# Patient Record
Sex: FEMALE | Race: WHITE | NOT HISPANIC OR LATINO | URBAN - METROPOLITAN AREA
[De-identification: names, ages, dates, MRNs, and addresses within clinical notes are randomized per-mention and may not be internally consistent; named-entity substitution may affect disease eponyms.]

---

## 2021-09-24 ENCOUNTER — EMERGENCY (EMERGENCY)
Facility: HOSPITAL | Age: 36
LOS: 1 days | Discharge: HOME | End: 2021-09-24
Attending: EMERGENCY MEDICINE
Payer: MEDICAID

## 2021-09-24 VITALS
HEART RATE: 84 BPM | SYSTOLIC BLOOD PRESSURE: 135 MMHG | RESPIRATION RATE: 16 BRPM | WEIGHT: 172.62 LBS | DIASTOLIC BLOOD PRESSURE: 77 MMHG | OXYGEN SATURATION: 99 % | TEMPERATURE: 98 F

## 2021-09-24 DIAGNOSIS — Z98.890 OTHER SPECIFIED POSTPROCEDURAL STATES: Chronic | ICD-10-CM

## 2021-09-24 LAB
ALBUMIN SERPL ELPH-MCNC: 3.6 G/DL — SIGNIFICANT CHANGE UP (ref 3.5–5)
ALP SERPL-CCNC: 67 U/L — SIGNIFICANT CHANGE UP (ref 40–120)
ALT FLD-CCNC: 22 U/L DA — SIGNIFICANT CHANGE UP (ref 10–60)
ANION GAP SERPL CALC-SCNC: 12 MMOL/L — SIGNIFICANT CHANGE UP (ref 5–17)
APPEARANCE UR: CLEAR — SIGNIFICANT CHANGE UP
AST SERPL-CCNC: 10 U/L — SIGNIFICANT CHANGE UP (ref 10–40)
BASOPHILS # BLD AUTO: 0.06 K/UL — SIGNIFICANT CHANGE UP (ref 0–0.2)
BASOPHILS NFR BLD AUTO: 0.9 % — SIGNIFICANT CHANGE UP (ref 0–2)
BILIRUB SERPL-MCNC: 1.2 MG/DL — SIGNIFICANT CHANGE UP (ref 0.2–1.2)
BILIRUB UR-MCNC: NEGATIVE — SIGNIFICANT CHANGE UP
BUN SERPL-MCNC: 6 MG/DL — LOW (ref 7–18)
CALCIUM SERPL-MCNC: 8.7 MG/DL — SIGNIFICANT CHANGE UP (ref 8.4–10.5)
CHLORIDE SERPL-SCNC: 109 MMOL/L — HIGH (ref 96–108)
CO2 SERPL-SCNC: 20 MMOL/L — LOW (ref 22–31)
COLOR SPEC: YELLOW — SIGNIFICANT CHANGE UP
CREAT SERPL-MCNC: 0.75 MG/DL — SIGNIFICANT CHANGE UP (ref 0.5–1.3)
DIFF PNL FLD: NEGATIVE — SIGNIFICANT CHANGE UP
EOSINOPHIL # BLD AUTO: 0.38 K/UL — SIGNIFICANT CHANGE UP (ref 0–0.5)
EOSINOPHIL NFR BLD AUTO: 5.4 % — SIGNIFICANT CHANGE UP (ref 0–6)
GLUCOSE SERPL-MCNC: 91 MG/DL — SIGNIFICANT CHANGE UP (ref 70–99)
GLUCOSE UR QL: NEGATIVE — SIGNIFICANT CHANGE UP
HCG SERPL-ACNC: <1 MIU/ML — SIGNIFICANT CHANGE UP
HCT VFR BLD CALC: 39.2 % — SIGNIFICANT CHANGE UP (ref 34.5–45)
HGB BLD-MCNC: 13.2 G/DL — SIGNIFICANT CHANGE UP (ref 11.5–15.5)
IMM GRANULOCYTES NFR BLD AUTO: 0.3 % — SIGNIFICANT CHANGE UP (ref 0–1.5)
KETONES UR-MCNC: NEGATIVE — SIGNIFICANT CHANGE UP
LEUKOCYTE ESTERASE UR-ACNC: ABNORMAL
LIDOCAIN IGE QN: 88 U/L — SIGNIFICANT CHANGE UP (ref 73–393)
LYMPHOCYTES # BLD AUTO: 2.14 K/UL — SIGNIFICANT CHANGE UP (ref 1–3.3)
LYMPHOCYTES # BLD AUTO: 30.4 % — SIGNIFICANT CHANGE UP (ref 13–44)
MCHC RBC-ENTMCNC: 26.7 PG — LOW (ref 27–34)
MCHC RBC-ENTMCNC: 33.7 GM/DL — SIGNIFICANT CHANGE UP (ref 32–36)
MCV RBC AUTO: 79.2 FL — LOW (ref 80–100)
MONOCYTES # BLD AUTO: 0.64 K/UL — SIGNIFICANT CHANGE UP (ref 0–0.9)
MONOCYTES NFR BLD AUTO: 9.1 % — SIGNIFICANT CHANGE UP (ref 2–14)
NEUTROPHILS # BLD AUTO: 3.81 K/UL — SIGNIFICANT CHANGE UP (ref 1.8–7.4)
NEUTROPHILS NFR BLD AUTO: 53.9 % — SIGNIFICANT CHANGE UP (ref 43–77)
NITRITE UR-MCNC: NEGATIVE — SIGNIFICANT CHANGE UP
NRBC # BLD: 0 /100 WBCS — SIGNIFICANT CHANGE UP (ref 0–0)
PH UR: 7 — SIGNIFICANT CHANGE UP (ref 5–8)
PLATELET # BLD AUTO: 369 K/UL — SIGNIFICANT CHANGE UP (ref 150–400)
POTASSIUM SERPL-MCNC: 3.6 MMOL/L — SIGNIFICANT CHANGE UP (ref 3.5–5.3)
POTASSIUM SERPL-SCNC: 3.6 MMOL/L — SIGNIFICANT CHANGE UP (ref 3.5–5.3)
PROT SERPL-MCNC: 7.4 G/DL — SIGNIFICANT CHANGE UP (ref 6–8.3)
PROT UR-MCNC: NEGATIVE — SIGNIFICANT CHANGE UP
RBC # BLD: 4.95 M/UL — SIGNIFICANT CHANGE UP (ref 3.8–5.2)
RBC # FLD: 13.3 % — SIGNIFICANT CHANGE UP (ref 10.3–14.5)
SODIUM SERPL-SCNC: 141 MMOL/L — SIGNIFICANT CHANGE UP (ref 135–145)
SP GR SPEC: 1 — LOW (ref 1.01–1.02)
UROBILINOGEN FLD QL: NEGATIVE — SIGNIFICANT CHANGE UP
WBC # BLD: 7.05 K/UL — SIGNIFICANT CHANGE UP (ref 3.8–10.5)
WBC # FLD AUTO: 7.05 K/UL — SIGNIFICANT CHANGE UP (ref 3.8–10.5)

## 2021-09-24 PROCEDURE — 74177 CT ABD & PELVIS W/CONTRAST: CPT | Mod: 26,MG

## 2021-09-24 PROCEDURE — 96374 THER/PROPH/DIAG INJ IV PUSH: CPT | Mod: 25

## 2021-09-24 PROCEDURE — 96361 HYDRATE IV INFUSION ADD-ON: CPT

## 2021-09-24 PROCEDURE — 84702 CHORIONIC GONADOTROPIN TEST: CPT

## 2021-09-24 PROCEDURE — 87086 URINE CULTURE/COLONY COUNT: CPT

## 2021-09-24 PROCEDURE — 99283 EMERGENCY DEPT VISIT LOW MDM: CPT

## 2021-09-24 PROCEDURE — G1004: CPT

## 2021-09-24 PROCEDURE — 80053 COMPREHEN METABOLIC PANEL: CPT

## 2021-09-24 PROCEDURE — 74177 CT ABD & PELVIS W/CONTRAST: CPT | Mod: MG

## 2021-09-24 PROCEDURE — 83690 ASSAY OF LIPASE: CPT

## 2021-09-24 PROCEDURE — 76705 ECHO EXAM OF ABDOMEN: CPT | Mod: 26

## 2021-09-24 PROCEDURE — 99284 EMERGENCY DEPT VISIT MOD MDM: CPT

## 2021-09-24 PROCEDURE — 85025 COMPLETE CBC W/AUTO DIFF WBC: CPT

## 2021-09-24 PROCEDURE — 81001 URINALYSIS AUTO W/SCOPE: CPT

## 2021-09-24 PROCEDURE — 36415 COLL VENOUS BLD VENIPUNCTURE: CPT

## 2021-09-24 PROCEDURE — 76705 ECHO EXAM OF ABDOMEN: CPT

## 2021-09-24 PROCEDURE — 82962 GLUCOSE BLOOD TEST: CPT

## 2021-09-24 RX ORDER — SODIUM CHLORIDE 9 MG/ML
1000 INJECTION INTRAMUSCULAR; INTRAVENOUS; SUBCUTANEOUS ONCE
Refills: 0 | Status: COMPLETED | OUTPATIENT
Start: 2021-09-24 | End: 2021-09-24

## 2021-09-24 RX ORDER — POLYETHYLENE GLYCOL 3350 17 G/17G
1 POWDER, FOR SOLUTION ORAL
Qty: 1 | Refills: 0
Start: 2021-09-24

## 2021-09-24 RX ORDER — IOHEXOL 300 MG/ML
30 INJECTION, SOLUTION INTRAVENOUS ONCE
Refills: 0 | Status: COMPLETED | OUTPATIENT
Start: 2021-09-24 | End: 2021-09-24

## 2021-09-24 RX ORDER — ONDANSETRON 8 MG/1
4 TABLET, FILM COATED ORAL ONCE
Refills: 0 | Status: COMPLETED | OUTPATIENT
Start: 2021-09-24 | End: 2021-09-24

## 2021-09-24 RX ADMIN — ONDANSETRON 4 MILLIGRAM(S): 8 TABLET, FILM COATED ORAL at 12:49

## 2021-09-24 RX ADMIN — SODIUM CHLORIDE 1000 MILLILITER(S): 9 INJECTION INTRAMUSCULAR; INTRAVENOUS; SUBCUTANEOUS at 13:52

## 2021-09-24 RX ADMIN — IOHEXOL 30 MILLILITER(S): 300 INJECTION, SOLUTION INTRAVENOUS at 12:49

## 2021-09-24 RX ADMIN — Medication 1 TABLET(S): at 18:48

## 2021-09-24 RX ADMIN — SODIUM CHLORIDE 1000 MILLILITER(S): 9 INJECTION INTRAMUSCULAR; INTRAVENOUS; SUBCUTANEOUS at 12:49

## 2021-09-24 NOTE — ED PROVIDER NOTE - PROGRESS NOTE DETAILS
Patient is resting comfortably, NAD. CTAP shows possible cholecystitis. Will get US. No abdominal tenderness. US as above. Spoke to surgery PA who will come see patient. Patient is resting comfortably, NAD. Patient seen by surgery PA who d/w Dr. Zee. Recommend discharging on Augmentin. Will see Dr. Zee on Monday to schedule surgery. Patient also instructed to keep her GI appt. Return to the ED immediately if getting worse, not improving, or if having any new or troubling symptoms.

## 2021-09-24 NOTE — ED PROVIDER NOTE - CARE PLAN
Principal Discharge DX:	Constipation   1 Principal Discharge DX:	Cholecystitis, acute  Secondary Diagnosis:	Constipation

## 2021-09-24 NOTE — ED PROVIDER NOTE - CARE PROVIDER_API CALL
Jose Zee)  Surgery  [address] [place], [place] [id number]  Phone: [phone]  Fax: [phone]  Scheduled Appointment: 09/27/2021 09:00 AM

## 2021-09-24 NOTE — ED PROVIDER NOTE - NSFOLLOWUPINSTRUCTIONS_ED_ALL_ED_FT
Cholecystitis    WHAT YOU NEED TO KNOW:    Cholecystitis is inflammation of your gallbladder. Your gallbladder stores bile, which helps break down the fat that you eat. Your gallbladder also helps remove certain chemicals from your body. You may have a sudden, severe attack (acute cholecystitis) or several mild attacks (chronic cholecystitis).    Gallbladder         DISCHARGE INSTRUCTIONS:    Call 911 for any of the following:   •You have chest pain or trouble breathing.          Return to the emergency department if:   •You have severe pain in your abdomen.      •You urinate less than usual.      Contact your healthcare provider if:   •You have a fever or chills.      •You have pain when you urinate.      •Your skin or eyes turn yellow.      •You have questions or concerns about your condition or care.      Medicines: You may need any of the following:   •Antibiotics treat a bacterial infection.      •Prescription pain medicine may be given. Ask your healthcare provider how to take this medicine safely. Some prescription pain medicines contain acetaminophen. Do not take other medicines that contain acetaminophen without talking to your healthcare provider. Too much acetaminophen may cause liver damage. Prescription pain medicine may cause constipation. Ask your healthcare provider how to prevent or treat constipation.       •NSAIDs, such as ibuprofen, help decrease swelling, pain, and fever. This medicine is available with or without a doctor's order. NSAIDs can cause stomach bleeding or kidney problems in certain people. If you take blood thinner medicine, always ask your healthcare provider if NSAIDs are safe for you. Always read the medicine label and follow directions.      •Take your medicine as directed. Contact your healthcare provider if you think your medicine is not helping or if you have side effects. Tell him of her if you are allergic to any medicine. Keep a list of the medicines, vitamins, and herbs you take. Include the amounts, and when and why you take them. Bring the list or the pill bottles to follow-up visits. Carry your medicine list with you in case of an emergency.      Eat a variety of healthy foods: This may decrease your symptoms. Healthy foods include fruit, vegetables, whole-grain breads, low-fat dairy products, beans, lean meat, and fish. Ask if you need to be on a special diet.    Follow up with your doctor as directed: Write down your questions so you remember to ask them during your visits.        © Copyright [employer] 2021           back to top                          © Copyright [employer] 2021          Constipation    WHAT YOU NEED TO KNOW:    Constipation means you have hard, dry bowel movements, or you go longer than usual between bowel movements.    DISCHARGE INSTRUCTIONS:    Call your doctor if:   •You have blood in your bowel movements.      •You have a fever and abdominal pain with the constipation.      •Your constipation gets worse.      •You start to vomit.      •You have questions or concerns about your condition or care.      Medicines:   •Medicine such as a laxative may help relax and loosen your intestines to help you have a bowel movement. Your provider may recommend you only use laxatives for a short time. Long-term use may make your bowels dependent on the medicine.      •Take your medicine as directed. Contact your healthcare provider if you think your medicine is not helping or if you have side effects. Tell him of her if you are allergic to any medicine. Keep a list of the medicines, vitamins, and herbs you take. Include the amounts, and when and why you take them. Bring the list or the pill bottles to follow-up visits. Carry your medicine list with you in case of an emergency.      Relieve constipation:   •A suppository may be used to help soften your bowel movements. This may make them easier to pass. A suppository is guided into your rectum through your anus.  Suppository for Constipation           •An enema is liquid medicine used to clear bowel movement from your rectum. The medicine is put into your rectum through your anus.  Enemas           Prevent constipation:   •Drink liquids as directed. You may need to drink extra liquids to help soften and move your bowels. Ask how much liquid to drink each day and which liquids are best for you.      •Eat high-fiber foods. This may help decrease constipation by adding bulk to your bowel movements. High-fiber foods include fruit, vegetables, whole-grain breads and cereals, and beans. Your healthcare provider or dietitian can help you create a high-fiber meal plan. Your provider may also recommend a fiber supplement if you cannot get enough fiber from food.             •Exercise regularly. Regular physical activity can help stimulate your intestines. Walking is a good exercise to prevent or relieve constipation. Ask which exercises are best for you.  [employer] Walking for Exercise           •Schedule a time each day to have a bowel movement. This may help train your body to have regular bowel movements. Bend forward while you are on the toilet to help move the bowel movement out. Sit on the toilet for at least 10 minutes, even if you do not have a bowel movement.      •Talk to your healthcare provider about your medicines. Certain medicines, such as opioids, can cause constipation. Your provider may be able to make medicine changes. For example, he or she may change the kind of medicine, or change when you take it.      Follow up with your doctor as directed: Write down your questions so you remember to ask them during your visits.        © Copyright [employer] 2021           back to top                          © Copyright [employer] 2021

## 2021-09-24 NOTE — ED PROVIDER NOTE - OBJECTIVE STATEMENT
Patient reports chronic constipation since infancy but has been much worse for 1 month. Usually has hard BMs 1-2 x week but has not had one in 2 weeks, except for a tiny one after intense straining 6 days ago. Also now having early satiety and nausea when eating. Feels pressure in upper abdomen with increased abdominal girth but no pain. No fever, cp, sob, vomiting, diarrhea, weight loss, night sweats. Saw PMD on 9/10, had blood tests on 9/14, but office has not scheduled f/u yet. Has first every appt with GI on 10/14.

## 2021-09-24 NOTE — ED PROVIDER NOTE - CLINICAL SUMMARY MEDICAL DECISION MAKING FREE TEXT BOX
Patient with constipation, early satiety, nausea. Has never had CT before, only US for her ovarian cysts. Has never seen GI or had colonoscopy. Will check labs, CTAP, reassess.

## 2021-09-24 NOTE — CONSULT NOTE ADULT - SUBJECTIVE AND OBJECTIVE BOX
HPI: 36 year old female with PMH right knee surgery presents to ED complaining of 1 month of constipation, upper abdominal pressure, only able to tolerate small amount of oral intake and nausea. Denies fever, chills, vomiting, chest pain, abdominal pain. States that she has midline back pain which she see a chiropractor for.     PAST MEDICAL & SURGICAL HISTORY:  Constipation  Ovarian cyst  H/O right knee surgery    Review of Systems: Contained within HPI     MEDICATIONS  (STANDING):  MEDICATIONS  (PRN):    Allergies: No Known Allergies  Intolerances    SOCIAL HISTORY: Denies smoking, ETOH and drug abuse    Vital Signs Last 24 Hrs  T(C): 36.6 (24 Sep 2021 11:32), Max: 36.6 (24 Sep 2021 11:32)  T(F): 97.8 (24 Sep 2021 11:32), Max: 97.8 (24 Sep 2021 11:32)  HR: 84 (24 Sep 2021 11:32) (84 - 84)  BP: 135/77 (24 Sep 2021 11:32) (135/77 - 135/77)  RR: 16 (24 Sep 2021 11:32) (16 - 16)  SpO2: 99% (24 Sep 2021 11:32) (99% - 99%)    Physical Exam:    General:  Appears stated age, well-groomed, well-nourished, no distress  Eyes: EOMI  HENT:  WNL, no JVD  Chest: respirations nonlabored  Cardiovascular:  Regular rate & rhythm  Abdomen: soft, mild tenderness to RUQ, non distended   Extremities: no edema bilaterally  Skin: warm and dry  Musculoskeletal: no calf tenderness  Neuro:  Alert, oriented to time, place and person   Psych: normal affect    LABS:                        13.2   7.05  )-----------( 369      ( 24 Sep 2021 12:53 )             39.2     09-24    141  |  109<H>  |  6<L>  ----------------------------<  91  3.6   |  20<L>  |  0.75    Ca    8.7      24 Sep 2021 12:53    TPro  7.4  /  Alb  3.6  /  TBili  1.2  /  DBili  x   /  AST  10  /  ALT  22  /  AlkPhos  67  09-    Urinalysis Basic - ( 24 Sep 2021 14:15 )    Color: Yellow / Appearance: Clear / S.005 / pH: x  Gluc: x / Ketone: Negative  / Bili: Negative / Urobili: Negative   Blood: x / Protein: Negative / Nitrite: Negative   Leuk Esterase: Trace / RBC: 0-2 /HPF / WBC 0-2 /HPF   Sq Epi: x / Non Sq Epi: Occasional /HPF / Bacteria: Trace /HPF    RADIOLOGY & ADDITIONAL STUDIES:  < from: CT Abdomen and Pelvis w/ Oral Cont and w/ IV Cont (21 @ 15:29) >  EXAM:  CT ABDOMEN AND PELVIS OC IC                            PROCEDURE DATE:  2021          INTERPRETATION:  CLINICAL INFORMATION: Early satiety. Constipation    COMPARISON: None.    CONTRAST/COMPLICATIONS:  IV Contrast: Omnipaque 350  90 ccadministered   10 cc discarded  Oral Contrast: Omnipaque 300  Complications: None reported at time of study completion    PROCEDURE:  CT of the Abdomen and Pelvis was performed.  Sagittal and coronal reformats were performed.    FINDINGS:  LOWER CHEST: Within normal limits.    LIVER: Within normal limits.  BILE DUCTS: Normal caliber.  GALLBLADDER: Distended gallbladder with stones. Question trace pericholecystic fluid.  correlate with right upper quadrant ultrasound if there is clinical suspicionof cholecystitis.  SPLEEN: Within normal limits.  PANCREAS: Within normal limits.  ADRENALS: Within normal limits.  KIDNEYS/URETERS: Within normal limits.    BLADDER: Within normal limits.  REPRODUCTIVE ORGANS: Unremarkable    BOWEL: No bowel obstruction or active inflammation.. Appendix no appendicitis  PERITONEUM: No ascites.  VESSELS: Within normal limits.  RETROPERITONEUM/LYMPH NODES: No lymphadenopathy.  ABDOMINAL WALL: Within normal limits.  BONES: Within normal limits.    IMPRESSION:  Cholelithiasis. Possible trace pericholecystic fluid. Correlate with right upper quadrant ultrasound as clinically indicated.  No other acute, and no evidence of obstructive pathology    --- End of Report ---    CAMELIA PLUMMER MD; Attending Radiologist  This document has been electronically signed. Sep 24 2021  4:03PM    < end of copied text >    < from: US Hepatic & Pancreatic (21 @ 17:39) >  EXAM:  US LIVER AND PANCREAS                            PROCEDURE DATE:  2021          INTERPRETATION:  CLINICAL INFORMATION: abdominal pain    COMPARISON: CT abdomen pelvis performed on the same day.    TECHNIQUE: Sonography of the right upper quadrant.    FINDINGS:    Liver: Within normal limits.  Bile ducts: Normal caliber. Common hepatic duct measures 4 mm.  Gallbladder: Distended with multiple gallstones. No wall thickening, however sonographic Goff's sign is positive.  Pancreas: Visualized portions are within normal limits.  Right kidney: 11.1 cm. No hydronephrosis.  Ascites: None.  IVC: Visualized portions are within normal limits.    IMPRESSION:    Distended gallbladder containing stones. Sonographic Goff's sign is positive, concerning for acute cholecystitis. Correlate with HIDA scan if clinically warranted.    --- End of Report ---    LESLEY GIL MD; Attending Radiologist  This document has been electronically signed. Sep 24 2021  5:48PM    < end of copied text >

## 2021-09-24 NOTE — CONSULT NOTE ADULT - ASSESSMENT
36 year old female with cholelithiasis, afebrile, no leukocytosis.     - recommend outpatient follow up with Dr. Zee   - recommend Augmentin until follow up   - low fat diet  - pain control with OTC medication as needed  - Please follow up with Dr. Zee for elective laparoscopic cholecystectomy   - please call for any fever, chills, severe nausea and vomiting, severe pain, yellow discoloration of your eyes and skin.   - discussed with Dr. Zee

## 2021-09-26 LAB
CULTURE RESULTS: SIGNIFICANT CHANGE UP
SPECIMEN SOURCE: SIGNIFICANT CHANGE UP

## 2021-09-27 ENCOUNTER — APPOINTMENT (OUTPATIENT)
Dept: SURGERY | Facility: CLINIC | Age: 36
End: 2021-09-27
Payer: MEDICAID

## 2021-09-27 VITALS — SYSTOLIC BLOOD PRESSURE: 110 MMHG | DIASTOLIC BLOOD PRESSURE: 76 MMHG

## 2021-09-27 VITALS — HEIGHT: 63 IN | WEIGHT: 176 LBS | BODY MASS INDEX: 31.18 KG/M2

## 2021-09-27 VITALS — TEMPERATURE: 96.8 F

## 2021-09-27 DIAGNOSIS — Z01.818 ENCOUNTER FOR OTHER PREPROCEDURAL EXAMINATION: ICD-10-CM

## 2021-09-27 PROBLEM — Z00.00 ENCOUNTER FOR PREVENTIVE HEALTH EXAMINATION: Status: ACTIVE | Noted: 2021-09-27

## 2021-09-27 PROCEDURE — 99214 OFFICE O/P EST MOD 30 MIN: CPT

## 2021-09-27 NOTE — CONSULT LETTER
[Dear  ___] : Dear  [unfilled], [Consult Closing:] : Thank you very much for allowing me to participate in the care of this patient.  If you have any questions, please do not hesitate to contact me. [Sincerely,] : Sincerely, [FreeTextEntry3] : Jose Zee MD\par

## 2021-09-27 NOTE — HISTORY OF PRESENT ILLNESS
[de-identified] : Pt with symptomatic gall stones. She was in the ER and had US and CT with pericholecystic fluid and abdominal pain. \par Has the pain > 1 month in the RUQ abdomen\par No vomiting

## 2021-09-27 NOTE — PLAN
[FreeTextEntry1] : Patient with symptomatic gallstones and abdominal pain. Had a long d/w the pt. All the options, benefits and risks of laparoscopic cholecystectomy was discussed. The potential to go open, the small potential for bleeding, CBD injury, bile leak and other related complications were discussed. All the questions were answered to her satisfaction. Will schedule at [facility]\par

## 2021-09-27 NOTE — ASSESSMENT
[FreeTextEntry1] : EXAM:  US LIVER AND PANCREAS                      \par \par \par PROCEDURE DATE:  09/24/2021  \par \par \par \par INTERPRETATION:  CLINICAL INFORMATION: abdominal pain\par \par COMPARISON: CT abdomen pelvis performed on the same day.\par \par TECHNIQUE: Sonography of the right upper quadrant.\par \par FINDINGS:\par \par Liver: Within normal limits.\par Bile ducts: Normal caliber. Common hepatic duct measures 4 mm.\par Gallbladder: Distended with multiple gallstones. No wall thickening, however sonographic Goff's sign is positive.\par Pancreas: Visualized portions are within normal limits.\par Right kidney: 11.1 cm. No hydronephrosis.\par Ascites: None.\par IVC: Visualized portions are within normal limits.\par \par IMPRESSION:\par \par Distended gallbladder containing stones. Sonographic Goff's sign is positive, concerning for acute cholecystitis. Correlate with HIDA scan if clinically warranted.\par \par --- End of Report ---\par \par EXAM:  CT ABDOMEN AND PELVIS OC IC                      \par \par \par PROCEDURE DATE:  09/24/2021  \par \par \par \par INTERPRETATION:  CLINICAL INFORMATION: Early satiety. Constipation\par \par COMPARISON: None.\par \par CONTRAST/COMPLICATIONS:\par IV Contrast: Omnipaque 350  90 cc administered   10 cc discarded\par Oral Contrast: Omnipaque 300\par Complications: None reported at time of study completion\par \par PROCEDURE:\par CT of the Abdomen and Pelvis was performed.\par Sagittal and coronal reformats were performed.\par \par FINDINGS:\par LOWER CHEST: Within normal limits.\par \par LIVER: Within normal limits.\par BILE DUCTS: Normal caliber.\par GALLBLADDER: Distended gallbladder with stones. Question trace pericholecystic fluid.  correlate with right upper quadrant ultrasound if there is clinical suspicion of cholecystitis.\par SPLEEN: Within normal limits.\par PANCREAS: Within normal limits.\par ADRENALS: Within normal limits.\par KIDNEYS/URETERS: Within normal limits.\par \par BLADDER: Within normal limits.\par REPRODUCTIVE ORGANS: Unremarkable\par \par BOWEL: No bowel obstruction or active inflammation.. Appendix no appendicitis\par PERITONEUM: No ascites.\par VESSELS: Within normal limits.\par RETROPERITONEUM/LYMPH NODES: No lymphadenopathy.\par ABDOMINAL WALL: Within normal limits.\par BONES: Within normal limits.\par \par IMPRESSION:\par Cholelithiasis. Possible trace pericholecystic fluid. Correlate with right upper quadrant ultrasound as clinically indicated.\par No other acute, and no evidence of obstructive pathology\par \par \par \par --- End of Report ---\par \par \par \par \par \par CAMELIA PLUMMER MD; Attending Radiologist\par This document has been electronically signed. Sep 24 2021  4:03PM\par \par \par \par \par LESLEY GIL MD; Attending Radiologist\par This document has been electronically signed. Sep 24 2021  5:48PM\par

## 2021-09-28 ENCOUNTER — APPOINTMENT (OUTPATIENT)
Dept: DISASTER EMERGENCY | Facility: CLINIC | Age: 36
End: 2021-09-28

## 2021-09-28 ENCOUNTER — OUTPATIENT (OUTPATIENT)
Dept: OUTPATIENT SERVICES | Facility: HOSPITAL | Age: 36
LOS: 1 days | End: 2021-09-28
Payer: MEDICAID

## 2021-09-28 VITALS
RESPIRATION RATE: 16 BRPM | DIASTOLIC BLOOD PRESSURE: 82 MMHG | HEIGHT: 62.5 IN | HEART RATE: 73 BPM | OXYGEN SATURATION: 97 % | TEMPERATURE: 99 F | SYSTOLIC BLOOD PRESSURE: 117 MMHG | WEIGHT: 171.96 LBS

## 2021-09-28 DIAGNOSIS — K80.20 CALCULUS OF GALLBLADDER WITHOUT CHOLECYSTITIS WITHOUT OBSTRUCTION: ICD-10-CM

## 2021-09-28 DIAGNOSIS — Z01.818 ENCOUNTER FOR OTHER PREPROCEDURAL EXAMINATION: ICD-10-CM

## 2021-09-28 DIAGNOSIS — Z29.9 ENCOUNTER FOR PROPHYLACTIC MEASURES, UNSPECIFIED: ICD-10-CM

## 2021-09-28 DIAGNOSIS — Z98.890 OTHER SPECIFIED POSTPROCEDURAL STATES: Chronic | ICD-10-CM

## 2021-09-28 DIAGNOSIS — E16.2 HYPOGLYCEMIA, UNSPECIFIED: ICD-10-CM

## 2021-09-28 LAB — BLD GP AB SCN SERPL QL: SIGNIFICANT CHANGE UP

## 2021-09-28 PROCEDURE — G0463: CPT

## 2021-09-28 RX ORDER — SODIUM CHLORIDE 9 MG/ML
3 INJECTION INTRAMUSCULAR; INTRAVENOUS; SUBCUTANEOUS EVERY 8 HOURS
Refills: 0 | Status: DISCONTINUED | OUTPATIENT
Start: 2021-10-01 | End: 2021-10-01

## 2021-09-28 NOTE — H&P PST ADULT - HISTORY OF PRESENT ILLNESS
37 yo female with history of Constipation, Low Blood Sugar, reports the above.   She is scheduled for : Laparoscopic Cholecystectomy  Possible Open, on 10/1/21 35 yo female with history of Constipation, Low Blood Sugar, Dyslexia, reports the above.   She is scheduled for : Laparoscopic Cholecystectomy  Possible Open, on 10/1/21

## 2021-09-28 NOTE — H&P PST ADULT - PROBLEM SELECTOR PLAN 2
Instruction regarding chlorhexidine soap use is given.  Patient verbalized understanding.  Literature also given

## 2021-09-28 NOTE — H&P PST ADULT - NSICDXPASTMEDICALHX_GEN_ALL_CORE_FT
PAST MEDICAL HISTORY:  Constipation     Low blood sugar     Ovarian cyst      PAST MEDICAL HISTORY:  Constipation     Dyslexia     Low blood sugar     Ovarian cyst

## 2021-09-28 NOTE — H&P PST ADULT - REASON FOR ADMISSION
I have been constipated, I had a lot of pressure in my epigastric area approximately 3 weeks.  I have stopped eating, because of nausea, fulness after eating.  No vomiting

## 2021-09-29 LAB — SARS-COV-2 N GENE NPH QL NAA+PROBE: NOT DETECTED

## 2021-09-30 ENCOUNTER — TRANSCRIPTION ENCOUNTER (OUTPATIENT)
Age: 36
End: 2021-09-30

## 2021-10-01 ENCOUNTER — APPOINTMENT (OUTPATIENT)
Dept: SURGERY | Facility: HOSPITAL | Age: 36
End: 2021-10-01

## 2021-10-01 ENCOUNTER — OUTPATIENT (OUTPATIENT)
Dept: OUTPATIENT SERVICES | Facility: HOSPITAL | Age: 36
LOS: 1 days | End: 2021-10-01
Payer: MEDICAID

## 2021-10-01 ENCOUNTER — RESULT REVIEW (OUTPATIENT)
Age: 36
End: 2021-10-01

## 2021-10-01 VITALS
TEMPERATURE: 98 F | HEIGHT: 62.5 IN | WEIGHT: 171.96 LBS | DIASTOLIC BLOOD PRESSURE: 86 MMHG | OXYGEN SATURATION: 99 % | RESPIRATION RATE: 16 BRPM | SYSTOLIC BLOOD PRESSURE: 145 MMHG | HEART RATE: 71 BPM

## 2021-10-01 VITALS
HEART RATE: 65 BPM | RESPIRATION RATE: 16 BRPM | DIASTOLIC BLOOD PRESSURE: 75 MMHG | OXYGEN SATURATION: 100 % | SYSTOLIC BLOOD PRESSURE: 123 MMHG | TEMPERATURE: 98 F

## 2021-10-01 DIAGNOSIS — Z98.890 OTHER SPECIFIED POSTPROCEDURAL STATES: Chronic | ICD-10-CM

## 2021-10-01 DIAGNOSIS — Z01.818 ENCOUNTER FOR OTHER PREPROCEDURAL EXAMINATION: ICD-10-CM

## 2021-10-01 DIAGNOSIS — K80.20 CALCULUS OF GALLBLADDER WITHOUT CHOLECYSTITIS WITHOUT OBSTRUCTION: ICD-10-CM

## 2021-10-01 LAB
BLD GP AB SCN SERPL QL: SIGNIFICANT CHANGE UP
HCG UR QL: NEGATIVE — SIGNIFICANT CHANGE UP

## 2021-10-01 PROCEDURE — 86901 BLOOD TYPING SEROLOGIC RH(D): CPT

## 2021-10-01 PROCEDURE — C1889: CPT

## 2021-10-01 PROCEDURE — 86850 RBC ANTIBODY SCREEN: CPT

## 2021-10-01 PROCEDURE — 88304 TISSUE EXAM BY PATHOLOGIST: CPT | Mod: 26

## 2021-10-01 PROCEDURE — 88304 TISSUE EXAM BY PATHOLOGIST: CPT

## 2021-10-01 PROCEDURE — 47562 LAPAROSCOPIC CHOLECYSTECTOMY: CPT

## 2021-10-01 PROCEDURE — 86900 BLOOD TYPING SEROLOGIC ABO: CPT

## 2021-10-01 PROCEDURE — 36415 COLL VENOUS BLD VENIPUNCTURE: CPT

## 2021-10-01 PROCEDURE — 81025 URINE PREGNANCY TEST: CPT

## 2021-10-01 RX ORDER — HYDROMORPHONE HYDROCHLORIDE 2 MG/ML
0.5 INJECTION INTRAMUSCULAR; INTRAVENOUS; SUBCUTANEOUS
Refills: 0 | Status: DISCONTINUED | OUTPATIENT
Start: 2021-10-01 | End: 2021-10-01

## 2021-10-01 RX ORDER — OXYCODONE HYDROCHLORIDE 5 MG/1
5 TABLET ORAL ONCE
Refills: 0 | Status: DISCONTINUED | OUTPATIENT
Start: 2021-10-01 | End: 2021-10-01

## 2021-10-01 RX ORDER — ONDANSETRON 8 MG/1
4 TABLET, FILM COATED ORAL ONCE
Refills: 0 | Status: DISCONTINUED | OUTPATIENT
Start: 2021-10-01 | End: 2021-10-01

## 2021-10-01 RX ORDER — SODIUM CHLORIDE 9 MG/ML
1000 INJECTION, SOLUTION INTRAVENOUS
Refills: 0 | Status: DISCONTINUED | OUTPATIENT
Start: 2021-10-01 | End: 2021-10-01

## 2021-10-01 RX ADMIN — HYDROMORPHONE HYDROCHLORIDE 0.5 MILLIGRAM(S): 2 INJECTION INTRAMUSCULAR; INTRAVENOUS; SUBCUTANEOUS at 17:07

## 2021-10-01 RX ADMIN — OXYCODONE HYDROCHLORIDE 5 MILLIGRAM(S): 5 TABLET ORAL at 18:20

## 2021-10-01 RX ADMIN — HYDROMORPHONE HYDROCHLORIDE 0.5 MILLIGRAM(S): 2 INJECTION INTRAMUSCULAR; INTRAVENOUS; SUBCUTANEOUS at 17:37

## 2021-10-01 RX ADMIN — SODIUM CHLORIDE 3 MILLILITER(S): 9 INJECTION INTRAMUSCULAR; INTRAVENOUS; SUBCUTANEOUS at 11:58

## 2021-10-01 RX ADMIN — OXYCODONE HYDROCHLORIDE 5 MILLIGRAM(S): 5 TABLET ORAL at 17:50

## 2021-10-01 NOTE — ASU DISCHARGE PLAN (ADULT/PEDIATRIC) - ASU DC SPECIAL INSTRUCTIONSFT
- for mild to moderate postop pain, take over the counter pain medications (alternate tylenol and ibuprofen/motrin as needed every 4-6 hours as recommended on the bottle)   - for severe pain not well controlled by the over the counter medications, take one tablet of percocet every 4-6 hours as needed     **caution with tylenol/acetaminophen - do not exceed over 4000mg in 24hour period

## 2021-10-01 NOTE — CONSULT NOTE ADULT - ASSESSMENT
35 yo female is scheduled for : Laparoscopic Cholecystectomy  Possible Open, on 10/1/21  Noted RBBB on ECG-no evidence for structural heart disease       Problem/Plan - 1:  ·  Problem: Calculus of gallbladder without cholecystitis without obstruction.   ·  Plan: Laparoscopic Cholecystectomy  Possible Open.  Patient's Revised Cardiac Risk Index (RCRI) score is 0  (  3..9 % risk) . Patient is at low  risk of  adverse perioperative cardiac events undergoing low  risk surgery. No further cardiac testing is needed prior to patient's surgery.

## 2021-10-01 NOTE — ASU DISCHARGE PLAN (ADULT/PEDIATRIC) - CARE PROVIDER_API CALL
----- Message from Forest Barajas MD sent at 8/5/2018  9:34 PM CDT -----  Stable test results except for low vitamin D, supplement otc 5000 IU daily with food  
Attempted to call patients mother, no answer. Left vm to return office call.     
Patient/mother informed of low vitamin D level results and  Supplementation instructions per Dr. Barajas.    Patient/mother verbalized understanding and did not have any questions regarding results or instructions.  
Jose Zee)  Surgery  [address] [place], [place] [id number]  Phone: [phone]  Fax: [phone]  Established Patient  Follow Up Time: 1 week

## 2021-10-01 NOTE — CONSULT NOTE ADULT - SUBJECTIVE AND OBJECTIVE BOX
DATE OF SERVICE:  10/01/2021  Patient was seen,examined and evaluated  by me.ER evaluation, Labs and Hospital course was reviewed,    CHIEF COMPLAINT:Abdominal Pain    HPI:35 yo female with history of Constipation, Low Blood Sugar, Dyslexia, reports the above.   She is scheduled for : Laparoscopic Cholecystectomy  Possible Open, on 10/1/21  Noted RBBB on ECG  denies chest pain dyspnea no syncope  Good effort tolerance with no symptoms      PAST MEDICAL & SURGICAL HISTORY:  Constipation    Ovarian cyst    Low blood sugar    Dyslexia    H/O right knee surgery        MEDICATIONS  (STANDING):  MiraLax oral powder for reconstitution: Last Dose Taken:  , 1 dose(s) orally once a day, As Needed- for constipation mixed in 8 ounces water or juice  · 	amoxicillin-clavulanate 875 mg-125 mg oral tablet: Last Dose Taken:  , 1 tab(s) orally 2 times a day for 7 da    FAMILY HISTORY:  FH: hypertension (Mother)      No family history of premature coronary artery disease or sudden cardiac death    SOCIAL HISTORY:  Smoking-[ ] Active  [ ] Former [ ] Non Smoker  Alcohol-[ ] Denies [ ] Social [ ] Daily  Ilicit Drug use-[ ] Denies [ ] Active user    REVIEW OF SYSTEMS:  Constitutional: [ ] fever, [ ]weight loss, [x ]fatigue   Activity [ ] Bedboundx[x ] Ambulates [x ] Unassisted[ ] Cane/Walker [ ] Assistence.  Effort tolerance:[ ] Excellent [x ] Good [ ] Fair [ ] Poor [ ]  Eyes: [ ] visual changes  Respiratory: [ ]shortness of breath;  [ ] cough, [ ]wheezing, [ ]chills, [ ]hemoptysis  Cardiovascular: [ ] chest pain, [ ]palpitations, [ ]dizziness,  [ ]leg swelling[ ]orthopnea [ ]PND  Gastrointestinal: [x ] abdominal pain, [ ]nausea, [ ]vomiting,  [ ]diarrhea,[ ]constipation  Genitourinary: [ ] dysuria, [ ] hematuria  Neurologic: [ ] headaches [ ] tremors[ ] weakness  Skin: [ ] itching, [ ]burning, [ ] rashes  Endocrine: [ ] heat or cold intolerance  Musculoskeletal: [ ] joint pain or swelling; [ ] muscle, back, or extremity pain  Psychiatric: [ ] depression, [ ]anxiety, [ ]mood swings, or [ ]difficulty sleeping  Hematologic: [ ] easy bruising, [ ] bleeding gums       [ x] All others negative	  [ ] Unable to obtain    Vital Signs Last 24 Hrs    · Temp (C)	37.4 Degrees C  · Heart Rate	73 /min  · Respiration Rate (breaths/min)	16 /min  · BP Systolic	117 mm Hg  · BP Diastolic	82 mm Hg  · Blood Pressure - Method	electronic  · BP Noninvasive Mean	93 mm Hg  · SpO2 (%)	97 %  · O2 Delivery/Oxygen Delivery Method	room air    PHYSICAL EXAM:  General: No acute distress BMI-31  HEENT: EOMI, PERRL[ ] Icteric  Neck: Supple, No JVD  Lungs: Equal air entry bilaterally; [ ] Rales [ ] Rhonchi [ ] Wheezing  Heart: Regular rate and rhythm;[x ] Murmurs-   2/6 [x ] Systolic [ ] Diastolic [ ] Radiation,No rubs, or gallops  Abdomen: Nontender, bowel sounds present  Extremities: No clubbing, cyanosis, or edema[ ] Calf tenderness  Nervous system:  Alert & Oriented X3, no focal deficits  Psychiatric: Normal affect  Skin: No rashes or lesions      LABS:        Creatinine Trend: 0.75<--          ECG [my interpretation]:Sinus rhythm RBBB

## 2021-10-01 NOTE — CONSULT NOTE ADULT - TIME BILLING
- Review of records, telemetry, vital signs and daily labs.   - General and cardiovascular physical examination.  - Generation of cardiovascular treatment plan.  - Coordination of care.      Patient was seen and examined by me on 10/01/2021,interim events noted,labs and radiology studies reviewed.  Dima King MD,FACC.  [address] [place], [place] [id number].  [phone]

## 2021-10-04 PROBLEM — R48.0 DYSLEXIA AND ALEXIA: Chronic | Status: ACTIVE | Noted: 2021-09-28

## 2021-10-04 PROBLEM — E16.2 HYPOGLYCEMIA, UNSPECIFIED: Chronic | Status: ACTIVE | Noted: 2021-09-28

## 2021-10-04 PROBLEM — N83.209 UNSPECIFIED OVARIAN CYST, UNSPECIFIED SIDE: Chronic | Status: ACTIVE | Noted: 2021-09-24

## 2021-10-04 PROBLEM — K59.00 CONSTIPATION, UNSPECIFIED: Chronic | Status: ACTIVE | Noted: 2021-09-24

## 2021-10-07 LAB — SURGICAL PATHOLOGY STUDY: SIGNIFICANT CHANGE UP

## 2021-10-11 ENCOUNTER — APPOINTMENT (OUTPATIENT)
Dept: SURGERY | Facility: CLINIC | Age: 36
End: 2021-10-11
Payer: MEDICAID

## 2021-10-11 VITALS — TEMPERATURE: 97.5 F

## 2021-10-11 DIAGNOSIS — K80.20 CALCULUS OF GALLBLADDER W/OUT CHOLECYSTITIS W/OUT OBSTRUCTION: ICD-10-CM

## 2021-10-11 PROCEDURE — 99024 POSTOP FOLLOW-UP VISIT: CPT

## 2021-10-11 RX ORDER — ACETAMINOPHEN EXTRA STRENGTH 500 MG/1
500 TABLET ORAL
Qty: 30 | Refills: 0 | Status: ACTIVE | COMMUNITY
Start: 2021-09-29

## 2021-10-11 RX ORDER — AMOXICILLIN AND CLAVULANATE POTASSIUM 875; 125 MG/1; MG/1
875-125 TABLET, COATED ORAL
Qty: 14 | Refills: 0 | Status: ACTIVE | COMMUNITY
Start: 2021-09-24

## 2021-10-11 RX ORDER — POLYETHYLENE GLYCOL 3350 17 G/17G
17 POWDER, FOR SOLUTION ORAL
Qty: 510 | Refills: 0 | Status: ACTIVE | COMMUNITY
Start: 2021-09-24

## 2021-10-11 RX ORDER — OXYCODONE AND ACETAMINOPHEN 5; 325 MG/1; MG/1
5-325 TABLET ORAL
Qty: 12 | Refills: 0 | Status: ACTIVE | COMMUNITY
Start: 2021-10-01

## 2021-10-11 NOTE — CONSULT LETTER
[Dear  ___] : Dear  [unfilled], [Courtesy Letter:] : I had the pleasure of seeing your patient, [unfilled], in my office today. [Sincerely,] : Sincerely, [FreeTextEntry3] : Dr Zee

## 2021-10-11 NOTE — PHYSICAL EXAM
[No Rash or Lesion] : No rash or lesion [Alert] : alert [Oriented to Person] : oriented to person [Oriented to Place] : oriented to place [Oriented to Time] : oriented to time [Calm] : calm [de-identified] : The patient is alert, well-groomed  [de-identified] : Breath sounds equal and bilateral, no wheezing no rales or rhonchi  [de-identified] :  good S1, S2, no m/r/g bilateral  [de-identified] : Incision site is healing well  [de-identified] : WNL

## 2021-10-11 NOTE — REASON FOR VISIT
[Post Op: _________] : a [unfilled] post op visit [FreeTextEntry1] : s/p Laparoscopic cholecystectomy on 10/01/2021

## 2021-10-11 NOTE — ASSESSMENT
[FreeTextEntry1] : ELLEN CERNA is a 36 year old female who underwent a Laparoscopic cholecystectomy on 10/01/2021 pathology results are consistent with Chronic calculous cholecystitis\par \par Patient is doing well, with expected post-operative recovery. All surgical incisions are healing well and as expected. There is no evidence of infection or complication, and patient is progressing as expected. Post-operative wound care, activity, restrictions and precautions reinforced. path discussed. Patient was instructed no heavy lifting  4-6 weeks. Patient's questions and concerns addressed to patient's satisfaction. \par

## 2021-10-11 NOTE — HISTORY OF PRESENT ILLNESS
[de-identified] : ELLEN CERNA is a 36 year old female who presents in the office for postop visit. She underwent a  Laparoscopic cholecystectomy on 10/01/2021 pathology results are consistent with Chronic calculous cholecystitis. Today patient is doing well, c/o some weakness. Denies any fevers, chills, nausea, vomiting, diarrhea or constipation. Patient able to tolerate regular diet with normal bowel movements. Surgical incisions are healing well. No sign of inflammation or exudate. 
none

## 2022-02-23 NOTE — H&P PST ADULT - NS PRO FEM  PAP SMEARS 3YRS
PLEASE READ YOUR DISCHARGE INSTRUCTIONS ENTIRELY AS IT CONTAINS IMPORTANT INFORMATION.      Please drink plenty of fluids.    Please get plenty of rest.    Please return here or go to the Emergency Department for any concerns or worsening of condition.    Please take an over the counter antihistamine medication (allegra/Claritin/Zyrtec) of your choice as directed.    Try an over the counter decongestant like Mucinex D or Sudafed. You buy this behind the pharmacy counter    If you do have Hypertension or palpitations, it is safe to take Coricidin HBP for relief of sinus symptoms.    If not allergic, please take over the counter Tylenol (Acetaminophen) and/or Motrin (Ibuprofen) as directed for control of pain and/or fever.  Please follow up with your primary care doctor or specialist as needed.    Sore throat recommendations: Warm fluids, warm salt water gargles, throat lozenges, tea, honey, soup, rest, hydration.    Use over the counter flonase: one spray each nostril twice daily OR two sprays each nostril once daily.     If you  smoke, please stop smoking.      Please return or see your primary care doctor if you develop new or worsening symptoms.     Please arrange follow up with your primary medical clinic as soon as possible. You must understand that you've received an Urgent Care treatment only and that you may be released before all of your medical problems are known or treated. You, the patient, will arrange for follow up as instructed. If your symptoms worsen or fail to improve you should go to the Emergency Room.     None

## 2022-07-28 NOTE — ED PROVIDER NOTE - PATIENT PORTAL LINK FT
Detail Level: Detailed You can access the FollowMyHealth Patient Portal offered by [facility] by registering at the following website: [email or web address]. By joining [facility]’s FollowMyHealth portal, you will also be able to view your health information using other applications (apps) compatible with our system.

## 2022-10-10 NOTE — H&P PST ADULT - PROBLEM SELECTOR PROBLEM 2
Need for prophylactic measure CONST: Well appearing for age  HEAD:  Normocephalic, atraumatic  EYES: PERRLA, EOMI, no conjunctival erythema  ENT: TMs WNL. No nasal discharge; airway clear. Oropharynx WNL.  NECK: Supple; non tender.  CARDIAC:  Regular rate and rhythm, normal S1 and S2, no murmurs, rubs or gallops  RESP:  LCTAB; no rhonchi, stridor, wheezes, or rales; respiratory rate and effort appear normal for age  ABDOMEN:  Soft, nontender, nondistended.  BACK: (+) tenderness to thoracic paraspinal muscles. No midline tenderness.   LYMPHATICS:  No acute cervical lymphadenopathy  EXT: Normal ROM. No LE TTP or edema bilaterally.  MUSCULOSKELETAL/NEURO:  Normal movement, normal tone  SKIN:  No rashes; normal skin color for age and race, well-perfused; warm and dry CONST: Well appearing for age  HEAD:  Normocephalic, atraumatic  EYES: PERRLA, EOMI, no conjunctival erythema  ENT: TMs WNL. No nasal discharge; airway clear. Oropharynx WNL.  NECK: Supple; non tender.  CARDIAC:  Regular rate and rhythm, normal S1 and S2, no murmurs, rubs or gallops  RESP:  LCTAB; no rhonchi, stridor, wheezes, or rales; respiratory rate and effort appear normal for age  ABDOMEN:  Soft, nontender, nondistended.  BACK: (+) tenderness to thoracic paraspinal muscles and posterior left ribs. No midline tenderness or step off.   EXT: Normal ROM. No LE TTP or edema bilaterally.  MUSCULOSKELETAL/NEURO:  Normal movement, normal tone  SKIN:  No rashes; normal skin color for age and race, well-perfused; warm and dry
